# Patient Record
Sex: MALE | Race: WHITE | Employment: UNEMPLOYED | URBAN - METROPOLITAN AREA
[De-identification: names, ages, dates, MRNs, and addresses within clinical notes are randomized per-mention and may not be internally consistent; named-entity substitution may affect disease eponyms.]

---

## 2019-03-18 ENCOUNTER — OFFICE VISIT (OUTPATIENT)
Dept: NEUROLOGY | Age: 19
End: 2019-03-18
Payer: MEDICARE

## 2019-03-18 VITALS
RESPIRATION RATE: 14 BRPM | OXYGEN SATURATION: 99 % | WEIGHT: 110.4 LBS | BODY MASS INDEX: 17.33 KG/M2 | HEIGHT: 67 IN | DIASTOLIC BLOOD PRESSURE: 74 MMHG | HEART RATE: 85 BPM | SYSTOLIC BLOOD PRESSURE: 120 MMHG

## 2019-03-18 DIAGNOSIS — G40.909 SEIZURE DISORDER (HCC): Primary | ICD-10-CM

## 2019-03-18 PROCEDURE — 99204 OFFICE O/P NEW MOD 45 MIN: CPT | Performed by: PHYSICIAN ASSISTANT

## 2019-03-18 RX ORDER — RANITIDINE 150 MG/1
CAPSULE ORAL
COMMUNITY
End: 2019-06-24

## 2019-03-18 RX ORDER — DIAZEPAM 10 MG/2ML
GEL RECTAL
COMMUNITY
Start: 2015-12-03 | End: 2019-11-11 | Stop reason: ALTCHOICE

## 2019-03-18 RX ORDER — RISPERIDONE 1 MG/1
1 TABLET, FILM COATED ORAL 2 TIMES DAILY
COMMUNITY

## 2019-03-18 RX ORDER — LEVETIRACETAM 500 MG/1
500 TABLET ORAL 2 TIMES DAILY
Qty: 60 TABLET | Refills: 3 | Status: SHIPPED | OUTPATIENT
Start: 2019-03-18 | End: 2019-06-24 | Stop reason: SDUPTHER

## 2019-03-18 RX ORDER — POLYETHYLENE GLYCOL 3350 17 G/17G
POWDER, FOR SOLUTION ORAL DAILY PRN
COMMUNITY

## 2019-03-18 RX ORDER — CLONIDINE HYDROCHLORIDE 0.2 MG/1
TABLET ORAL
COMMUNITY

## 2019-03-18 RX ORDER — PETROLATUM 42 G/100G
OINTMENT TOPICAL
COMMUNITY
End: 2019-11-11

## 2019-03-18 RX ORDER — LORATADINE 10 MG/1
CAPSULE, LIQUID FILLED ORAL
COMMUNITY

## 2019-03-18 RX ORDER — NYSTATIN 100000 U/G
CREAM TOPICAL
COMMUNITY
Start: 2017-05-05 | End: 2019-06-24

## 2019-03-18 RX ORDER — PHENOBARBITAL 60 MG/1
TABLET ORAL
COMMUNITY
Start: 2019-02-26 | End: 2019-04-03 | Stop reason: SDUPTHER

## 2019-03-18 SDOH — HEALTH STABILITY: MENTAL HEALTH: HOW OFTEN DO YOU HAVE A DRINK CONTAINING ALCOHOL?: NEVER

## 2019-03-28 ENCOUNTER — TELEPHONE (OUTPATIENT)
Dept: NEUROLOGY | Age: 19
End: 2019-03-28

## 2019-03-28 DIAGNOSIS — G40.909 SEIZURE DISORDER (HCC): Primary | ICD-10-CM

## 2019-03-28 NOTE — TELEPHONE ENCOUNTER
Patients step mother states he needs a refill of the PHENobarbital 60 mg 1 tablet po at bedtime    MA asked if the Keppra was working and step mother stated \"she doesn't know. He has not had any seizures. ?\"    MA informed her the message would be forwarded on to Annemarie Mcgovern for further review.

## 2019-04-03 RX ORDER — PHENOBARBITAL 60 MG/1
TABLET ORAL
Qty: 30 TABLET | Refills: 2 | Status: SHIPPED | OUTPATIENT
Start: 2019-04-03 | End: 2019-06-24

## 2019-06-24 ENCOUNTER — OFFICE VISIT (OUTPATIENT)
Dept: NEUROLOGY | Age: 19
End: 2019-06-24
Payer: MEDICARE

## 2019-06-24 VITALS — OXYGEN SATURATION: 98 % | BODY MASS INDEX: 17.89 KG/M2 | RESPIRATION RATE: 13 BRPM | WEIGHT: 114 LBS | HEIGHT: 67 IN

## 2019-06-24 DIAGNOSIS — G40.909 SEIZURE DISORDER (HCC): ICD-10-CM

## 2019-06-24 PROCEDURE — 99214 OFFICE O/P EST MOD 30 MIN: CPT | Performed by: PHYSICIAN ASSISTANT

## 2019-06-24 RX ORDER — LEVETIRACETAM 500 MG/1
500 TABLET ORAL 2 TIMES DAILY
Qty: 60 TABLET | Refills: 5 | Status: SHIPPED | OUTPATIENT
Start: 2019-06-24 | End: 2019-12-27

## 2019-06-24 NOTE — PATIENT INSTRUCTIONS
Patient Education        levetiracetam  Pronunciation:  MELISSA zimmer RA se hanson  Brand:  Keppra, Keppra XR, Roweepra, Spritam  What is the most important information I should know about levetiracetam?  Some people have thoughts about suicide when first taking this medicine. Stay alert to changes in your mood or symptoms. Report any new or worsening symptoms to your doctor. What is levetiracetam?  Levetiracetam is an anti-epileptic drug, also called an anticonvulsant. Levetiracetam is used to treat partial onset seizures in adults and children who are at least 2 month old. The Spritam brand of this medicine is not for use in children younger than 3years old or children who weigh less than 44 pounds. Levetiracetam is also used to treat tonic-clonic seizures in people who are at least 10years old, and myoclonic seizures in people who are at least 15years old. Levetiracetam may also be used for purposes not listed in this medication guide. What should I discuss with my healthcare provider before taking levetiracetam?  You should not use levetiracetam if you are allergic to it. Tell your doctor if you have ever had:  · kidney disease (or if you are on dialysis);  · depression or other mood problems;  · mental illness or psychosis; or  · suicidal thoughts or actions. You may have thoughts about suicide while taking this medicine. Your doctor should check your progress at regular visits. Your family or other caregivers should also be alert to changes in your mood or symptoms. Follow your doctor's instructions about taking seizure medication if you are pregnant. Seizure control is very important during pregnancy, and having a seizure could harm both mother and baby. Do not start or stop taking this medicine without your doctor's advice, and tell your doctor right away if you become pregnant. If you are pregnant, your name may be listed on a pregnancy registry to track the effects of levetiracetam on the baby.   You a dose? Take the medicine as soon as you can, but skip the missed dose if it is almost time for your next dose. Do not take two doses at one time. Get your prescription refilled before you run out of medicine completely. What happens if I overdose? Seek emergency medical attention or call the Poison Help line at 1-308.413.8771. Overdose symptoms may include extreme drowsiness, agitation, aggression, shallow breathing, weakness, or fainting. What should I avoid while taking levetiracetam?  Drinking alcohol with this medicine can cause side effects and may also increase the risk of seizures. Avoid driving or hazardous activity until you know how this medicine will affect you. Dizziness or drowsiness can cause falls, accidents, or severe injuries. What are the possible side effects of levetiracetam?  Get emergency medical help if you have signs of an allergic reaction (hives, difficult breathing, swelling in your face or throat) or a severe skin reaction (fever, sore throat, burning in your eyes, skin pain, red or purple skin rash that spreads and causes blistering and peeling). Report any new or worsening symptoms to your doctor, such as: mood or behavior changes, depression, anxiety, panic attacks, trouble sleeping, or if you feel agitated, hostile, irritable, hyperactive (mentally or physically), or have thoughts about suicide or hurting yourself. Call your doctor at once if you have:  · unusual changes in mood or behavior (unusual risk-taking behavior, being irritable or talkative);  · confusion, hallucinations, loss of balance or coordination;  · extreme drowsiness, feeling very weak or tired;  · problems with walking or movement;  · the first sign of any skin rash, no matter how mild; or  · fever, chills, weakness, or other signs of infection.   Common side effects may include:  · dizziness, drowsiness, tiredness;  · weakness;  · feeling aggressive or irritable;  · loss of appetite;  · stuffy nose;

## 2019-06-24 NOTE — PROGRESS NOTES
[facility]. Jorden Santos M.D., F.A.C.P. Willie Pantoja, DNP, APRN, ACNS-BC  Cisco Dance. Samantah Young, MSN, APRN-FNP-C  Jacklyn Bui MSPAS, PA-C  Guanakito Cortes, MSN, APRN-FNP-C  [address], [place]  [place], [address] [address]  Phone: [phone]  Fax: [phone]       Demetrio Brower is a 22 y.o. right handed male       Review of Systems:     ROS unable-- patient is non verbal     History of Present Illness :     Patient with ID/DD developed generalized seizures 3 years ago. Previously with one GTC seizure every 3-5 months on Phenobarbital 60 mg HS. Presents with step mother. The patient is non verbal and does not provide any of the history. His step mom is a fair historian. At the last visit, he was started on Keppra 500 mg BID. Phenobarbital was to be continued for the time being. Rx request for phenobarbital refill was sent 3/28/19 to the pharmacy. His step mother states that she never picked this up. He has now been off of the Phenobarbital since March and has been doing well on Keppra alone. The patient did suffer one 2 minute seizure in April due to running out of Keppra and missing a dose. There have been no other seizures. He appears to be tolerating Keppra well without ill effects. Otherwise, he is medically stable. Objective:       Resp 13   Ht 5' 6.5\" (1.689 m)   Wt 114 lb (51.7 kg)   SpO2 98%   BMI 18.12 kg/m² . BP was unable to be obtained today     General appearance: alert, appears stated age, cooperative and in no distress  Head: normocephalic, without obvious abnormality, atraumatic  Eyes: conjunctivae/corneas clear; no drainage  Neck: no adenopathy,supple, symmetrical, trachea midline and thyroid not enlarged, symmetric, no tenderness/mass/nodules  Back: symmetric, no curvature.  ROM normal.   Lungs: clear to auscultation bilaterally  Heart: regular rate and rhythm, S1, S2 normal, no murmur  Extremities: normal, atraumatic, no however it appears grossly non focal      Medically, he is stable     Plan:     Continue Keppra 500 mg BID   Refill sent     Call if any BT seizures   Dose can be adjusted over phone     Continue Diastat prn     Will obtain routine blood work at next visit if no recent blood work by PCP     RTO 6 months or sooner prn     Call with questions or concerns     Mark Gan PA-C  10:17 AM  6/24/2019

## 2019-11-11 ENCOUNTER — OFFICE VISIT (OUTPATIENT)
Dept: NEUROLOGY | Age: 19
End: 2019-11-11
Payer: MEDICARE

## 2019-11-11 VITALS
WEIGHT: 113 LBS | HEART RATE: 83 BPM | OXYGEN SATURATION: 98 % | SYSTOLIC BLOOD PRESSURE: 112 MMHG | DIASTOLIC BLOOD PRESSURE: 78 MMHG | RESPIRATION RATE: 12 BRPM | BODY MASS INDEX: 17.97 KG/M2

## 2019-11-11 DIAGNOSIS — G40.909 SEIZURE DISORDER (HCC): Primary | ICD-10-CM

## 2019-11-11 PROCEDURE — 1036F TOBACCO NON-USER: CPT | Performed by: PHYSICIAN ASSISTANT

## 2019-11-11 PROCEDURE — G8484 FLU IMMUNIZE NO ADMIN: HCPCS | Performed by: PHYSICIAN ASSISTANT

## 2019-11-11 PROCEDURE — 99213 OFFICE O/P EST LOW 20 MIN: CPT | Performed by: PHYSICIAN ASSISTANT

## 2019-11-11 PROCEDURE — G8427 DOCREV CUR MEDS BY ELIG CLIN: HCPCS | Performed by: PHYSICIAN ASSISTANT

## 2019-11-11 PROCEDURE — G8419 CALC BMI OUT NRM PARAM NOF/U: HCPCS | Performed by: PHYSICIAN ASSISTANT

## 2019-11-11 RX ORDER — DIAZEPAM 10 MG/2ML
10 GEL RECTAL PRN
Qty: 15 EACH | Refills: 5 | Status: SHIPPED | OUTPATIENT
Start: 2019-11-11 | End: 2020-11-11

## 2019-12-27 DIAGNOSIS — G40.909 SEIZURE DISORDER (HCC): ICD-10-CM

## 2019-12-27 RX ORDER — LEVETIRACETAM 500 MG/1
TABLET ORAL
Qty: 60 TABLET | Refills: 5 | Status: SHIPPED
Start: 2019-12-27 | End: 2020-07-07

## 2020-07-07 RX ORDER — LEVETIRACETAM 500 MG/1
TABLET ORAL
Qty: 60 TABLET | Refills: 5 | Status: SHIPPED
Start: 2020-07-07 | End: 2020-07-29 | Stop reason: SDUPTHER

## 2020-07-29 ENCOUNTER — VIRTUAL VISIT (OUTPATIENT)
Dept: NEUROLOGY | Age: 20
End: 2020-07-29
Payer: MEDICARE

## 2020-07-29 PROCEDURE — 99212 OFFICE O/P EST SF 10 MIN: CPT | Performed by: PHYSICIAN ASSISTANT

## 2020-07-29 RX ORDER — LEVETIRACETAM 500 MG/1
TABLET ORAL
Qty: 60 TABLET | Refills: 11 | Status: SHIPPED
Start: 2020-07-29 | End: 2021-05-04 | Stop reason: SDUPTHER

## 2020-07-29 NOTE — PROGRESS NOTES
[address] [facility]. Sunshine Ocasio M.D., F.A.C.P. Anish Summers, DNP, APRN, CNS  Olya Barkley. Donna Gerardo, MSN, APRN-FNP-C  Franky Chung MSN, APRN, FNP-C  Iza VELASQUEZ, PA-C  [address] MSN, APRN, FNP-C  [address], [facility]  [place], [address] [address]  Phone: [phone]  Fax: [phone]             Shahid Garcia is a 20 y.o. male evaluated via telephone on 7/29/2020. The patient and/or health care decision maker is aware that that he/she may receive a bill for this telephone service, depending on his/her insurance coverage, and has provided verbal consent to proceed: Yes    I affirm this is a Patient Initiated Episode with an Established Patient who has not had a related appointment within my department in the past 7 days or scheduled within the next 24 hours. The patient's identity was verified at the start of the call. Others involved in call: Stepmother Kaitlin Garvey) who provided all of the interim history as the patient is non verbal     Total Time: minutes: 5-10 minutes    Note: not billable if this call serves to triage the patient into an appointment for the relevant concern    HPI:      Shahid Garcia is a 20 y.o. right handed male     Patient is non verbal and history is obtained from his step-mother.      Patient with ID/DD developed generalized seizures 4 years ago. Previously with one GTC seizure every 3-5 months on Phenobarbital 60 mg HS. He has since weaned off of Phenobarbital and is on Keppra 500 mg BID.       He has possibly had \"one small one\" described as eyes rolling back and knees shaking a little in the setting of extreme heat in the last year. No GTC seizures in the last year (last 4/2019).      No chest pain or palpitations  No SOB  No vertigo, lightheadedness or loss of consciousness  No falls, tripping or stumbling  No incontinence of bowels or bladder  No itching or bruising   No numbness, tingling or focal arm/leg weakness    ROS otherwise negative      Medications:     Prior to Admission medications    Medication Sig Start Date End Date Taking? Authorizing Provider   levETIRAcetam (KEPPRA) 500 MG tablet take 1 tablet by mouth twice a day 7/7/20  Yes KEYSHAWN Kyle   diazepam (DIASTAT ACUDIAL) 10 MG GEL Place 10 mg rectally as needed (seizures). 11/11/19 11/11/20 Yes KEYSHAWN Kyle   risperiDONE (RISPERDAL) 1 MG tablet Take 1 mg by mouth 2 times daily Indications: Take 0.5mg in the AM and 1mg at night    Yes Historical Provider, MD   polyethylene glycol (GLYCOLAX) packet Take by mouth daily as needed    Yes Historical Provider, MD   OMEPRAZOLE PO Take by mouth   Yes Historical Provider, MD   loratadine (CLARITIN) 10 MG capsule Take by mouth   Yes Historical Provider, MD   cloNIDine (CATAPRES) 0.2 MG tablet Take by mouth   Yes Historical Provider, MD       Laboratory/Radiology:      There are no recent labs or images  for me to review     All labs and images personally reviewed today    Assessment:     20year old man with autism and severe ID/DD suffers from a generalized seizure disorder with unknown onset and impaired awareness             Last seizure in 4/2019 in the setting of a missed Keppra dose -- one \"small\"  seizure reported since then with eyes rolling back and knees shaking in the  setting of extreme heat              Has been tolerating Keppra 500 mg BID well    Plan:     Continue Keppra 500 mg BID    Refill sent    Continue Diastat prn      Call with any BT seizures     RTO 9 months     Call with questions or concerns     Crissy Parkinson PA-C  1:49 PM  7/29/2020

## 2021-05-04 ENCOUNTER — OFFICE VISIT (OUTPATIENT)
Dept: NEUROLOGY | Age: 21
End: 2021-05-04
Payer: MEDICARE

## 2021-05-04 VITALS
SYSTOLIC BLOOD PRESSURE: 126 MMHG | HEART RATE: 77 BPM | OXYGEN SATURATION: 98 % | DIASTOLIC BLOOD PRESSURE: 76 MMHG | TEMPERATURE: 98.2 F

## 2021-05-04 DIAGNOSIS — G40.909 SEIZURE DISORDER (HCC): ICD-10-CM

## 2021-05-04 PROCEDURE — 1036F TOBACCO NON-USER: CPT | Performed by: PHYSICIAN ASSISTANT

## 2021-05-04 PROCEDURE — G8421 BMI NOT CALCULATED: HCPCS | Performed by: PHYSICIAN ASSISTANT

## 2021-05-04 PROCEDURE — 99213 OFFICE O/P EST LOW 20 MIN: CPT | Performed by: PHYSICIAN ASSISTANT

## 2021-05-04 PROCEDURE — G8427 DOCREV CUR MEDS BY ELIG CLIN: HCPCS | Performed by: PHYSICIAN ASSISTANT

## 2021-05-04 RX ORDER — LEVETIRACETAM 500 MG/1
TABLET ORAL
Qty: 60 TABLET | Refills: 11 | Status: SHIPPED
Start: 2021-05-04 | End: 2022-05-31

## 2021-05-04 NOTE — PROGRESS NOTES
[facility]. Kim Mijares M.D., F.A.C.P. Radha Lucas, DNP, APRN, ACNS-BC  Bayron Smith. Blanca Null, MSN, APRN-FNP-C  Scottie Gastelum, RON, PA-C  Sunita Nieves, MSN, APRN-FNP-C  [address], [facility]  [place], [address] [address]  Phone: [phone]  Fax: [phone]       Fay Licona is a 23 y.o. right handed male     Patient with ID/DD developed generalized seizures 5 years ago. Previously with one GTC seizure every 3-5 months on Phenobarbital 60 mg HS. He has since weaned off of Phenobarbital and is on Keppra 500 mg BID. Presents with step mother. The patient is non verbal and does not provide any of the history. His step mom is a fair historian. Per family, patient has had no seizures since last visit. Continues on Keppra 500 mg BID without ill effect. Last seizure 4/2019. Possible \"small\" seizure in Summer of 2020 due to extreme heat. No further seizure activity since. Otherwise, he is medically stable. ROS unable-- patient is non verbal     Objective:       /76 (Site: Right Upper Arm)   Pulse 77   Temp 98.2 °F (36.8 °C)   SpO2 98% . General appearance: alert, appears stated age, cooperative and in no distress  Head: normocephalic, without obvious abnormality, atraumatic  Eyes: conjunctivae/corneas clear; no drainage  Neck: no adenopathy,supple  Lungs: clear to auscultation bilaterally  Heart: regular rate and rhythm, S1, S2 normal, no murmur  Extremities: normal, atraumatic, no cyanosis or edema  Skin:  No rashes or lesions. Mental Status: Alert. Non verbal. Follows very few simple commands.  Attends to examiner      Decreased attention/concentration      Exam limited due to lack of cooperation from patient's severe developmental delay      Speech/Language: Nonverbal      Cranial Nerves:  I: smell     II: visual acuity      II: visual fields Blink to threat    II: pupils SAUD   III,VII: ptosis None   III,IV,VI: extraocular muscles Tracks examiner around room    V: mastication Normal   V: facial light touch sensation      V,VII: corneal reflex      VII: facial muscle function - upper  Normal   VII: facial muscle function - lower Face symmetric    VIII: hearing Intact    IX: soft palate elevation  Normal   IX,X: gag reflex     XI: trapezius strength  5/5   XI: sternocleidomastoid strength 5/5   XI: neck extension strength  5/5   XII: tongue strength  Normal      Motor:  Moves all limbs symmetrically against gravity-- does not cooperating for strength testing    Decreased bulk   Paratonia at times   No abnormal movements     Sensory:  Attends to vibration in all limbs     Coordination:   Does not cooperate for testing     Gait:  Normal     DTR:   +1 throughout      No Vargas's     No other pathological reflexes    Examination is unchanged today      Laboratory/Radiology: There are no recent labs or imaging for me to review     All labs and imaging were personally reviewed today     Assessment: This 23year old man with autism and severe ID/DD suffers from a generalized seizure disorder with unknown onset and impaired awareness             Last seizure in 4/2019 in the setting of a missed Keppra dose and possible \"small\" seizure in  summer 2020 in the setting of extreme heat.  No recent seizure activity reported   Has been tolerating Keppra 500 mg BID well     His neurologic examination is limited due to his cooperation and ID, however it appears grossly non focal      Medically, he is stable     Plan:     Continue Keppra 500 mg BID   Refill sent     Call if any BT seizures   Dose can be adjusted over phone     Continue Diastat prn     RTO 1 year or sooner prn     Call with questions or concerns     Rayshawn Horan PA-C  11:57 AM  5/4/2021

## 2022-05-27 DIAGNOSIS — G40.909 SEIZURE DISORDER (HCC): ICD-10-CM

## 2022-05-31 ENCOUNTER — OFFICE VISIT (OUTPATIENT)
Dept: NEUROLOGY | Age: 22
End: 2022-05-31
Payer: MEDICARE

## 2022-05-31 VITALS
OXYGEN SATURATION: 99 % | SYSTOLIC BLOOD PRESSURE: 100 MMHG | DIASTOLIC BLOOD PRESSURE: 70 MMHG | TEMPERATURE: 97.6 F | HEART RATE: 77 BPM

## 2022-05-31 DIAGNOSIS — G40.909 SEIZURE DISORDER (HCC): ICD-10-CM

## 2022-05-31 PROCEDURE — G8427 DOCREV CUR MEDS BY ELIG CLIN: HCPCS | Performed by: PHYSICIAN ASSISTANT

## 2022-05-31 PROCEDURE — G8421 BMI NOT CALCULATED: HCPCS | Performed by: PHYSICIAN ASSISTANT

## 2022-05-31 PROCEDURE — 1036F TOBACCO NON-USER: CPT | Performed by: PHYSICIAN ASSISTANT

## 2022-05-31 PROCEDURE — 99213 OFFICE O/P EST LOW 20 MIN: CPT | Performed by: PHYSICIAN ASSISTANT

## 2022-05-31 RX ORDER — LEVETIRACETAM 500 MG/1
TABLET ORAL
Qty: 60 TABLET | Refills: 11 | Status: SHIPPED
Start: 2022-05-31 | End: 2022-05-31 | Stop reason: SDUPTHER

## 2022-05-31 RX ORDER — ASCORBIC ACID 500 MG
TABLET ORAL
COMMUNITY
Start: 2022-05-07

## 2022-05-31 RX ORDER — LEVETIRACETAM 500 MG/1
TABLET ORAL
Qty: 60 TABLET | Refills: 11 | Status: SHIPPED | OUTPATIENT
Start: 2022-05-31

## 2022-05-31 NOTE — PROGRESS NOTES
extraocular muscles  Tracks examiner around room    V: mastication Normal   V: facial light touch sensation      V,VII: corneal reflex      VII: facial muscle function - upper  Normal   VII: facial muscle function - lower Face symmetric    VIII: hearing Intact    IX: soft palate elevation  Normal   IX,X: gag reflex     XI: trapezius strength  5/5   XI: sternocleidomastoid strength 5/5   XI: neck extension strength  5/5   XII: tongue strength  Normal      Motor:  Moves all limbs symmetrically against gravity-- does not cooperating for strength testing      No abnormal movements     Sensory:  Attends to vibration in all limbs     Coordination:   Does not cooperate for testing     Gait:  Normal     DTR:   +1 throughout      No other pathological reflexes    Examination is unchanged today      Laboratory/Radiology: There are no recent labs or imaging for me to review     All labs and imaging were personally reviewed today     Assessment: This 24year old man with autism and severe ID/DD suffers from a generalized seizure disorder with unknown onset and impaired awareness             Last seizure in 4/2019 in the setting of a missed Keppra dose and possible \"small\" seizure in  summer 2020 in the setting of extreme heat.  No recent seizure activity reported   Has been tolerating Keppra 500 mg BID well     His neurologic examination is limited due to his cooperation and ID, however it appears grossly non focal      Medically, he is stable     Plan:     Continue Keppra 500 mg BID   Refill sent     Call if any BT seizures    Continue Diastat prn      RTO 1 year or sooner prn     Call with questions or concerns     Mckay Cash PA-C  2:04 PM  5/31/2022

## 2023-05-20 DIAGNOSIS — G40.909 SEIZURE DISORDER (HCC): ICD-10-CM

## 2023-05-22 RX ORDER — LEVETIRACETAM 500 MG/1
TABLET ORAL
Qty: 60 TABLET | Refills: 11 | Status: SHIPPED | OUTPATIENT
Start: 2023-05-22

## 2023-06-29 ENCOUNTER — OFFICE VISIT (OUTPATIENT)
Dept: NEUROLOGY | Age: 23
End: 2023-06-29
Payer: MEDICAID

## 2023-06-29 VITALS
HEART RATE: 77 BPM | DIASTOLIC BLOOD PRESSURE: 72 MMHG | WEIGHT: 113 LBS | HEIGHT: 66 IN | TEMPERATURE: 98.6 F | BODY MASS INDEX: 18.16 KG/M2 | RESPIRATION RATE: 16 BRPM | SYSTOLIC BLOOD PRESSURE: 110 MMHG | OXYGEN SATURATION: 97 %

## 2023-06-29 DIAGNOSIS — G40.909 SEIZURE DISORDER (HCC): ICD-10-CM

## 2023-06-29 PROCEDURE — 99213 OFFICE O/P EST LOW 20 MIN: CPT | Performed by: PHYSICIAN ASSISTANT

## 2023-06-29 RX ORDER — LEVETIRACETAM 500 MG/1
TABLET ORAL
Qty: 60 TABLET | Refills: 11 | Status: SHIPPED | OUTPATIENT
Start: 2023-06-29

## 2024-09-26 ENCOUNTER — OFFICE VISIT (OUTPATIENT)
Dept: NEUROLOGY | Age: 24
End: 2024-09-26
Payer: MEDICAID

## 2024-09-26 VITALS
DIASTOLIC BLOOD PRESSURE: 68 MMHG | OXYGEN SATURATION: 98 % | WEIGHT: 128 LBS | HEART RATE: 89 BPM | BODY MASS INDEX: 20.66 KG/M2 | SYSTOLIC BLOOD PRESSURE: 112 MMHG

## 2024-09-26 DIAGNOSIS — G40.909 SEIZURE DISORDER (HCC): Primary | ICD-10-CM

## 2024-09-26 PROCEDURE — 99214 OFFICE O/P EST MOD 30 MIN: CPT | Performed by: PHYSICIAN ASSISTANT

## 2024-09-26 RX ORDER — MIDAZOLAM 5 MG/.1ML
5 SPRAY NASAL PRN
Qty: 2 EACH | Refills: 2 | Status: SHIPPED | OUTPATIENT
Start: 2024-09-26

## 2025-01-03 ENCOUNTER — HOSPITAL ENCOUNTER (OUTPATIENT)
Age: 25
Setting detail: OBSERVATION
Discharge: HOME | End: 2025-01-05
Attending: EMERGENCY MEDICINE | Admitting: GENERAL PRACTICE
Payer: MEDICAID

## 2025-01-03 DIAGNOSIS — R19.7 NAUSEA VOMITING AND DIARRHEA: Primary | ICD-10-CM

## 2025-01-03 DIAGNOSIS — R11.2 NAUSEA VOMITING AND DIARRHEA: Primary | ICD-10-CM

## 2025-01-03 LAB
ABO + RH BLD: NORMAL
ALBUMIN SERPL-MCNC: 4.9 G/DL (ref 3.5–5.2)
ALP SERPL-CCNC: 49 U/L (ref 40–129)
ALT SERPL-CCNC: 14 U/L (ref 0–40)
ANION GAP SERPL CALCULATED.3IONS-SCNC: 16 MMOL/L (ref 7–16)
ARM BAND NUMBER: NORMAL
AST SERPL-CCNC: 21 U/L (ref 0–39)
BASOPHILS # BLD: 0.01 K/UL (ref 0–0.2)
BASOPHILS NFR BLD: 0 % (ref 0–2)
BILIRUB SERPL-MCNC: 0.7 MG/DL (ref 0–1.2)
BLOOD BANK SAMPLE EXPIRATION: NORMAL
BLOOD GROUP ANTIBODIES SERPL: NEGATIVE
BUN SERPL-MCNC: 17 MG/DL (ref 6–20)
CALCIUM SERPL-MCNC: 9.9 MG/DL (ref 8.6–10.2)
CHLORIDE SERPL-SCNC: 103 MMOL/L (ref 98–107)
CO2 SERPL-SCNC: 20 MMOL/L (ref 22–29)
CREAT SERPL-MCNC: 0.8 MG/DL (ref 0.7–1.2)
EOSINOPHIL # BLD: 0 K/UL (ref 0.05–0.5)
EOSINOPHILS RELATIVE PERCENT: 0 % (ref 0–6)
ERYTHROCYTE [DISTWIDTH] IN BLOOD BY AUTOMATED COUNT: 11.8 % (ref 11.5–15)
GFR, ESTIMATED: >90 ML/MIN/1.73M2
GLUCOSE SERPL-MCNC: 139 MG/DL (ref 74–99)
HCT VFR BLD AUTO: 46.5 % (ref 37–54)
HGB BLD-MCNC: 16 G/DL (ref 12.5–16.5)
IMM GRANULOCYTES # BLD AUTO: <0.03 K/UL (ref 0–0.58)
IMM GRANULOCYTES NFR BLD: 0 % (ref 0–5)
LACTATE BLDV-SCNC: 0.8 MMOL/L (ref 0.5–2.2)
LACTATE BLDV-SCNC: 2.5 MMOL/L (ref 0.5–2.2)
LIPASE SERPL-CCNC: 18 U/L (ref 13–60)
LYMPHOCYTES NFR BLD: 0.42 K/UL (ref 1.5–4)
LYMPHOCYTES RELATIVE PERCENT: 6 % (ref 20–42)
MCH RBC QN AUTO: 28.9 PG (ref 26–35)
MCHC RBC AUTO-ENTMCNC: 34.4 G/DL (ref 32–34.5)
MCV RBC AUTO: 84.1 FL (ref 80–99.9)
MONOCYTES NFR BLD: 0.44 K/UL (ref 0.1–0.95)
MONOCYTES NFR BLD: 6 % (ref 2–12)
NEUTROPHILS NFR BLD: 88 % (ref 43–80)
NEUTS SEG NFR BLD: 6.38 K/UL (ref 1.8–7.3)
PLATELET # BLD AUTO: 345 K/UL (ref 130–450)
PMV BLD AUTO: 10.8 FL (ref 7–12)
POTASSIUM SERPL-SCNC: 3.8 MMOL/L (ref 3.5–5)
PROT SERPL-MCNC: 8.9 G/DL (ref 6.4–8.3)
RBC # BLD AUTO: 5.53 M/UL (ref 3.8–5.8)
RBC # BLD: NORMAL 10*6/UL
SODIUM SERPL-SCNC: 139 MMOL/L (ref 132–146)
WBC OTHER # BLD: 7.3 K/UL (ref 4.5–11.5)

## 2025-01-03 PROCEDURE — 96374 THER/PROPH/DIAG INJ IV PUSH: CPT

## 2025-01-03 PROCEDURE — 85025 COMPLETE CBC W/AUTO DIFF WBC: CPT

## 2025-01-03 PROCEDURE — 6360000002 HC RX W HCPCS: Performed by: PHYSICIAN ASSISTANT

## 2025-01-03 PROCEDURE — 96375 TX/PRO/DX INJ NEW DRUG ADDON: CPT

## 2025-01-03 PROCEDURE — 96361 HYDRATE IV INFUSION ADD-ON: CPT

## 2025-01-03 PROCEDURE — G0378 HOSPITAL OBSERVATION PER HR: HCPCS

## 2025-01-03 PROCEDURE — 86900 BLOOD TYPING SEROLOGIC ABO: CPT

## 2025-01-03 PROCEDURE — 86850 RBC ANTIBODY SCREEN: CPT

## 2025-01-03 PROCEDURE — 83605 ASSAY OF LACTIC ACID: CPT

## 2025-01-03 PROCEDURE — 99285 EMERGENCY DEPT VISIT HI MDM: CPT

## 2025-01-03 PROCEDURE — 83690 ASSAY OF LIPASE: CPT

## 2025-01-03 PROCEDURE — 80053 COMPREHEN METABOLIC PANEL: CPT

## 2025-01-03 PROCEDURE — 86901 BLOOD TYPING SEROLOGIC RH(D): CPT

## 2025-01-03 PROCEDURE — 2580000003 HC RX 258: Performed by: PHYSICIAN ASSISTANT

## 2025-01-03 RX ORDER — ONDANSETRON 2 MG/ML
4 INJECTION INTRAMUSCULAR; INTRAVENOUS ONCE
Status: COMPLETED | OUTPATIENT
Start: 2025-01-03 | End: 2025-01-03

## 2025-01-03 RX ORDER — PANTOPRAZOLE SODIUM 40 MG/10ML
80 INJECTION, POWDER, LYOPHILIZED, FOR SOLUTION INTRAVENOUS ONCE
Status: COMPLETED | OUTPATIENT
Start: 2025-01-03 | End: 2025-01-03

## 2025-01-03 RX ORDER — 0.9 % SODIUM CHLORIDE 0.9 %
1000 INTRAVENOUS SOLUTION INTRAVENOUS ONCE
Status: COMPLETED | OUTPATIENT
Start: 2025-01-03 | End: 2025-01-03

## 2025-01-03 RX ORDER — LOPERAMIDE HYDROCHLORIDE 2 MG/1
2 CAPSULE ORAL
COMMUNITY
Start: 2024-12-17

## 2025-01-03 RX ORDER — LEVETIRACETAM 500 MG/5ML
500 INJECTION, SOLUTION, CONCENTRATE INTRAVENOUS 2 TIMES DAILY
Status: DISCONTINUED | OUTPATIENT
Start: 2025-01-03 | End: 2025-01-05 | Stop reason: HOSPADM

## 2025-01-03 RX ORDER — SODIUM CHLORIDE 9 MG/ML
INJECTION, SOLUTION INTRAVENOUS CONTINUOUS
Status: DISCONTINUED | OUTPATIENT
Start: 2025-01-03 | End: 2025-01-05 | Stop reason: HOSPADM

## 2025-01-03 RX ORDER — LOPERAMIDE HYDROCHLORIDE 2 MG/1
2 CAPSULE ORAL 4 TIMES DAILY PRN
Status: DISCONTINUED | OUTPATIENT
Start: 2025-01-04 | End: 2025-01-05 | Stop reason: HOSPADM

## 2025-01-03 RX ORDER — ONDANSETRON 4 MG/1
4 TABLET, FILM COATED ORAL 3 TIMES DAILY PRN
Qty: 12 TABLET | Refills: 0 | Status: SHIPPED | OUTPATIENT
Start: 2025-01-03

## 2025-01-03 RX ORDER — ONDANSETRON 2 MG/ML
4 INJECTION INTRAMUSCULAR; INTRAVENOUS EVERY 4 HOURS PRN
Status: DISCONTINUED | OUTPATIENT
Start: 2025-01-03 | End: 2025-01-05 | Stop reason: HOSPADM

## 2025-01-03 RX ORDER — PROCHLORPERAZINE EDISYLATE 5 MG/ML
10 INJECTION INTRAMUSCULAR; INTRAVENOUS EVERY 6 HOURS PRN
Status: DISCONTINUED | OUTPATIENT
Start: 2025-01-03 | End: 2025-01-05 | Stop reason: HOSPADM

## 2025-01-03 RX ADMIN — SODIUM CHLORIDE 1000 ML: 9 INJECTION, SOLUTION INTRAVENOUS at 17:02

## 2025-01-03 RX ADMIN — ONDANSETRON 4 MG: 2 INJECTION INTRAMUSCULAR; INTRAVENOUS at 15:36

## 2025-01-03 RX ADMIN — PANTOPRAZOLE SODIUM 80 MG: 40 INJECTION, POWDER, FOR SOLUTION INTRAVENOUS at 15:36

## 2025-01-03 RX ADMIN — SODIUM CHLORIDE 1000 ML: 9 INJECTION, SOLUTION INTRAVENOUS at 15:36

## 2025-01-03 ASSESSMENT — LIFESTYLE VARIABLES
HOW MANY STANDARD DRINKS CONTAINING ALCOHOL DO YOU HAVE ON A TYPICAL DAY: PATIENT DOES NOT DRINK
HOW OFTEN DO YOU HAVE A DRINK CONTAINING ALCOHOL: NEVER

## 2025-01-03 NOTE — ED PROVIDER NOTES
MEDICATIONS       Previous Medications    CLONIDINE (CATAPRES) 0.2 MG TABLET    Take by mouth    LEVETIRACETAM (KEPPRA) 500 MG TABLET    take 1 tablet by mouth twice a day    LORATADINE (CLARITIN) 10 MG CAPSULE    Take by mouth    MIDAZOLAM (NAYZILAM) 5 MG/0.1ML SOLN    5 mg by Nasal route as needed (seizure)    OMEPRAZOLE PO    Take 20 mg by mouth     POLYETHYLENE GLYCOL (GLYCOLAX) PACKET    Take by mouth daily as needed     RISPERIDONE (RISPERDAL) 1 MG TABLET    Take 1 tablet by mouth 2 times daily Indications: Take 0.5mg in the AM and 1mg at night    VITAMIN C (ASCORBIC ACID) 500 MG TABLET    take 1 tablet by mouth once daily    VITAMIN D (CHOLECALCIFEROL) 125 MCG (5000 UT) CAPS CAPSULE    Take by mouth daily       SCREENINGS     [place] Coma Scale  Eye Opening: Spontaneous  Best Verbal Response: Oriented  Best Motor Response: Obeys commands  [place] Coma Scale Score: 15         CIWA Assessment  BP: (!) 124/108  Pulse: 100       PHYSICAL EXAM   Oxygen Saturation Interpretation: Normal on room air analysis.        ED Triage Vitals   BP Systolic BP Percentile Diastolic BP Percentile Temp Temp src Pulse Resp SpO2   01/03/25 1326 -- -- 01/03/25 1325 -- 01/03/25 1325 -- 01/03/25 1325   122/82   99.7 °F (37.6 °C)  89  99 %      Height Weight         -- --                         Physical Exam  Constitutional/General: Alert but nonverbal.   NAD.   Significant intellectual and developmental delay noted  HEENT:  NC/NT. PERRLA,  Airway patent.  Dry oral mucosa  Neck: Supple, full ROM, non tender to palpation in the midline.    Respiratory: Lungs clear to auscultation bilaterally, no wheezes, rales, or rhonchi. Not in respiratory distress  CV:  Regular rate. Regular rhythm. No murmurs, gallops, or rubs. 2+ distal pulses  Chest: No chest wall tenderness  GI:  + BS.  Abdomen non distended and soft.  No palpable tenderness.  No rebound, guarding, or rigidity. No pulsatile masses.  Musculoskeletal: Moves all extremities x 4.

## 2025-01-03 NOTE — ED NOTES
Pt tolerated eating his pudding and drinking a can of ginger ale with no difficulties. Pt has had no emesis during my shift.

## 2025-01-04 LAB
ALBUMIN SERPL-MCNC: 3.8 G/DL (ref 3.5–5.2)
ALP SERPL-CCNC: 36 U/L (ref 40–129)
ALT SERPL-CCNC: 11 U/L (ref 0–40)
ANION GAP SERPL CALCULATED.3IONS-SCNC: 9 MMOL/L (ref 7–16)
AST SERPL-CCNC: 19 U/L (ref 0–39)
BASOPHILS # BLD: 0.01 K/UL (ref 0–0.2)
BASOPHILS NFR BLD: 0 % (ref 0–2)
BILIRUB SERPL-MCNC: 0.4 MG/DL (ref 0–1.2)
BUN SERPL-MCNC: 11 MG/DL (ref 6–20)
CALCIUM SERPL-MCNC: 8.3 MG/DL (ref 8.6–10.2)
CHLORIDE SERPL-SCNC: 109 MMOL/L (ref 98–107)
CO2 SERPL-SCNC: 22 MMOL/L (ref 22–29)
CREAT SERPL-MCNC: 0.8 MG/DL (ref 0.7–1.2)
EOSINOPHIL # BLD: 0.01 K/UL (ref 0.05–0.5)
EOSINOPHILS RELATIVE PERCENT: 0 % (ref 0–6)
ERYTHROCYTE [DISTWIDTH] IN BLOOD BY AUTOMATED COUNT: 12.1 % (ref 11.5–15)
GFR, ESTIMATED: >90 ML/MIN/1.73M2
GLUCOSE SERPL-MCNC: 89 MG/DL (ref 74–99)
HCT VFR BLD AUTO: 38.4 % (ref 37–54)
HGB BLD-MCNC: 12.8 G/DL (ref 12.5–16.5)
IMM GRANULOCYTES # BLD AUTO: <0.03 K/UL (ref 0–0.58)
IMM GRANULOCYTES NFR BLD: 0 % (ref 0–5)
LYMPHOCYTES NFR BLD: 0.99 K/UL (ref 1.5–4)
LYMPHOCYTES RELATIVE PERCENT: 19 % (ref 20–42)
MCH RBC QN AUTO: 28.8 PG (ref 26–35)
MCHC RBC AUTO-ENTMCNC: 33.3 G/DL (ref 32–34.5)
MCV RBC AUTO: 86.5 FL (ref 80–99.9)
MONOCYTES NFR BLD: 0.53 K/UL (ref 0.1–0.95)
MONOCYTES NFR BLD: 10 % (ref 2–12)
NEUTROPHILS NFR BLD: 71 % (ref 43–80)
NEUTS SEG NFR BLD: 3.77 K/UL (ref 1.8–7.3)
PLATELET # BLD AUTO: 243 K/UL (ref 130–450)
PMV BLD AUTO: 10.7 FL (ref 7–12)
POTASSIUM SERPL-SCNC: 3.6 MMOL/L (ref 3.5–5)
PROT SERPL-MCNC: 6.8 G/DL (ref 6.4–8.3)
RBC # BLD AUTO: 4.44 M/UL (ref 3.8–5.8)
SODIUM SERPL-SCNC: 140 MMOL/L (ref 132–146)
WBC OTHER # BLD: 5.3 K/UL (ref 4.5–11.5)

## 2025-01-04 PROCEDURE — 2580000003 HC RX 258: Performed by: GENERAL PRACTICE

## 2025-01-04 PROCEDURE — 85025 COMPLETE CBC W/AUTO DIFF WBC: CPT

## 2025-01-04 PROCEDURE — 36415 COLL VENOUS BLD VENIPUNCTURE: CPT

## 2025-01-04 PROCEDURE — 80053 COMPREHEN METABOLIC PANEL: CPT

## 2025-01-04 PROCEDURE — 96361 HYDRATE IV INFUSION ADD-ON: CPT

## 2025-01-04 PROCEDURE — G0378 HOSPITAL OBSERVATION PER HR: HCPCS

## 2025-01-04 PROCEDURE — 6360000002 HC RX W HCPCS: Performed by: GENERAL PRACTICE

## 2025-01-04 PROCEDURE — 96376 TX/PRO/DX INJ SAME DRUG ADON: CPT

## 2025-01-04 PROCEDURE — 96375 TX/PRO/DX INJ NEW DRUG ADDON: CPT

## 2025-01-04 RX ADMIN — SODIUM CHLORIDE: 9 INJECTION, SOLUTION INTRAVENOUS at 01:08

## 2025-01-04 RX ADMIN — SODIUM CHLORIDE: 9 INJECTION, SOLUTION INTRAVENOUS at 11:17

## 2025-01-04 RX ADMIN — LEVETIRACETAM 500 MG: 100 INJECTION INTRAVENOUS at 23:18

## 2025-01-04 RX ADMIN — LEVETIRACETAM 500 MG: 100 INJECTION INTRAVENOUS at 08:25

## 2025-01-04 RX ADMIN — SODIUM CHLORIDE 40 MG: 9 INJECTION INTRAMUSCULAR; INTRAVENOUS; SUBCUTANEOUS at 08:25

## 2025-01-04 RX ADMIN — LEVETIRACETAM 500 MG: 100 INJECTION INTRAVENOUS at 01:04

## 2025-01-04 NOTE — ED NOTES
Pt had another episode of emesis. Bed linens changed, pt placed in clean gown. Pts mother at bedside.

## 2025-01-04 NOTE — ED NOTES
Went in to the room to discharge the patient and patient had a small amount of emesis behind his bed. Pt will be admitted.

## 2025-01-04 NOTE — PLAN OF CARE
Problem: Discharge Planning  Goal: Discharge to home or other facility with appropriate resources  Outcome: Progressing  Flowsheets (Taken 1/3/2025 2115)  Discharge to home or other facility with appropriate resources: Refer to discharge planning if patient needs post-hospital services based on physician order or complex needs related to functional status, cognitive ability or social support system     Problem: Risk for Elopement  Goal: Patient will not exit the unit/facility without proper excort  Outcome: Progressing  Flowsheets  Taken 1/3/2025 2115 by Lisa Hickman, RN  Nursing Interventions for Elopement Risk:   Assist with personal care needs such as toileting, eating, dressing, as needed to reduce the risk of wandering   Collaborate with family members/caregivers to mitigate the elopement risk   Communicate/escalate to charge nurse the risk of elopement   Make sure patient has all necessary personal care items   Escort with two staff members if patient must leave the unit   Place patient in room far away from exits and stairways   Reduce environmental triggers   Shoes and clothing collected and placed in gown attire  Taken 1/3/2025 1941 by Emily Dodge RN  Nursing Interventions for Elopement Risk:   Assist with personal care needs such as toileting, eating, dressing, as needed to reduce the risk of wandering   Collaborate with family members/caregivers to mitigate the elopement risk   Communicate/escalate to charge nurse the risk of elopement   Make sure patient has all necessary personal care items   Escort with two staff members if patient must leave the unit   Place patient in room far away from exits and stairways   Reduce environmental triggers   Shoes and clothing collected and placed in gown attire     Problem: Pain  Goal: Verbalizes/displays adequate comfort level or baseline comfort level  Outcome: Progressing

## 2025-01-04 NOTE — PLAN OF CARE
Problem: Discharge Planning  Goal: Discharge to home or other facility with appropriate resources  1/4/2025 0932 by Ailyn Quiroga RN  Outcome: Progressing  1/4/2025 0845 by Lisa Hickman RN  Outcome: Progressing  Flowsheets (Taken 1/3/2025 2115)  Discharge to home or other facility with appropriate resources: Refer to discharge planning if patient needs post-hospital services based on physician order or complex needs related to functional status, cognitive ability or social support system     Problem: Risk for Elopement  Goal: Patient will not exit the unit/facility without proper excort  1/4/2025 0932 by Ailyn Quiroga RN  Outcome: Progressing  1/4/2025 0845 by Lisa Hickman RN  Outcome: Progressing  Flowsheets  Taken 1/3/2025 2115 by Lisa Hickman RN  Nursing Interventions for Elopement Risk:   Assist with personal care needs such as toileting, eating, dressing, as needed to reduce the risk of wandering   Collaborate with family members/caregivers to mitigate the elopement risk   Communicate/escalate to charge nurse the risk of elopement   Make sure patient has all necessary personal care items   Escort with two staff members if patient must leave the unit   Place patient in room far away from exits and stairways   Reduce environmental triggers   Shoes and clothing collected and placed in gown attire  Taken 1/3/2025 1941 by Emily Dodge, PETE  Nursing Interventions for Elopement Risk:   Assist with personal care needs such as toileting, eating, dressing, as needed to reduce the risk of wandering   Collaborate with family members/caregivers to mitigate the elopement risk   Communicate/escalate to charge nurse the risk of elopement   Make sure patient has all necessary personal care items   Escort with two staff members if patient must leave the unit   Place patient in room far away from exits and stairways   Reduce environmental triggers   Shoes and clothing collected and placed in gown attire

## 2025-01-04 NOTE — H&P
[place] [address]                           HISTORY & PHYSICAL      PATIENT NAME: FANI PERSAUD             : 2000  MED REC NO: [id number]                        ROOM: [id number]  ACCOUNT NO: [id number]                       ADMIT DATE: 2025  PROVIDER: Frankie Walton DO      HISTORY OF PRESENT ILLNESS:  The patient is an autistic 24-year-old white male with developmental delays, who presented to the hospital with a history and chief complaint of intractable nausea, vomiting, and diarrhea.  His grandmother has a similar problem, and it is suspected that she has [place] virus, likely the same scenario here.  He has been unable to keep anything down, multiple times of vomiting, multiple times of diarrhea.  We are going to go ahead and bring him in and hydrate him and check his stool for C diff, the usual pathogens, and cryptosporidium, start him on some fluids, keep him n.p.o. at this point.    PAST MEDICAL HISTORY:  Significant for asthma, chronic constipation, developmental delay, pica, bilious emesis, pyloric stenosis, seizure disorder.    PREVIOUS SURGICAL HISTORY:  Significant for pyloroplasty.    ALLERGIES:  TO PANCAKE MIX AND PEPPERMINT, BEE VENOM, HEPARIN, PENICILLINS, AND PLAVIX.      REVIEW OF SYSTEMS:  CONSTITUTIONAL:  Negative for vertigo, cephalgia, ringing in the ears, hearing loss, difficulty swallowing, hoarseness in his voice, bloody noses.    CARDIAC:  Currently, has no chest pain, palpitations, orthopnea, paroxysmal nocturnal dyspnea.    RESPIRATORY:  No cough, wheeze, hemoptysis.   GASTROINTESTINAL:  Positive for nausea.  Positive for vomiting.  Positive for diarrhea.  No blood in the stool.  Hemoccult on his emesis was checked and was positive, likely just from excessive vomiting.   GENITOURINARY:  Without urgency, frequency, dysuria, hematuria.   ENDOCRINE SYSTEM:  Negative for diabetes or thyroid

## 2025-01-04 NOTE — ED NOTES
ED to Inpatient Handoff Report    Notified Fay that electronic handoff available and patient ready for transport to room 537.    Safety Risks: Risk of falls    Patient in Restraints: no    Constant Observer or Patient : no    Telemetry Monitoring Ordered: Yes          Order to transfer to unit without monitor: YES    Last MEWS: 1 Time completed: 2005    Deterioration Index: 13.9    Vitals:    01/03/25 1326 01/03/25 1843 01/03/25 1941 01/03/25 2005   BP: 122/82 (!) 124/108  (!) 140/75   Pulse:  100  56   Resp:  17  16   Temp:  97.4 °F (36.3 °C)  97.8 °F (36.6 °C)   TempSrc:  Axillary     SpO2:  99%  98%   Weight:   59 kg (130 lb)    Height:   1.6 m (5' 3\")        Opportunity for questions and clarification was provided.

## 2025-01-05 VITALS
HEIGHT: 63 IN | DIASTOLIC BLOOD PRESSURE: 69 MMHG | HEART RATE: 70 BPM | SYSTOLIC BLOOD PRESSURE: 133 MMHG | WEIGHT: 114.1 LBS | BODY MASS INDEX: 20.21 KG/M2 | TEMPERATURE: 98.1 F | RESPIRATION RATE: 16 BRPM | OXYGEN SATURATION: 95 %

## 2025-01-05 PROCEDURE — 96361 HYDRATE IV INFUSION ADD-ON: CPT

## 2025-01-05 PROCEDURE — 96376 TX/PRO/DX INJ SAME DRUG ADON: CPT

## 2025-01-05 PROCEDURE — 6360000002 HC RX W HCPCS: Performed by: GENERAL PRACTICE

## 2025-01-05 PROCEDURE — 2580000003 HC RX 258: Performed by: GENERAL PRACTICE

## 2025-01-05 PROCEDURE — G0378 HOSPITAL OBSERVATION PER HR: HCPCS

## 2025-01-05 RX ORDER — TOBRAMYCIN AND DEXAMETHASONE 3; 1 MG/ML; MG/ML
2 SUSPENSION/ DROPS OPHTHALMIC
Qty: 5 ML | Refills: 0 | Status: SHIPPED | OUTPATIENT
Start: 2025-01-05 | End: 2025-01-15

## 2025-01-05 RX ORDER — TOBRAMYCIN AND DEXAMETHASONE 3; 1 MG/ML; MG/ML
2 SUSPENSION/ DROPS OPHTHALMIC
Status: DISCONTINUED | OUTPATIENT
Start: 2025-01-05 | End: 2025-01-05 | Stop reason: HOSPADM

## 2025-01-05 RX ADMIN — SODIUM CHLORIDE 40 MG: 9 INJECTION INTRAMUSCULAR; INTRAVENOUS; SUBCUTANEOUS at 07:26

## 2025-01-05 RX ADMIN — LEVETIRACETAM 500 MG: 100 INJECTION INTRAVENOUS at 10:10

## 2025-01-05 RX ADMIN — SODIUM CHLORIDE: 9 INJECTION, SOLUTION INTRAVENOUS at 11:54

## 2025-01-05 NOTE — PLAN OF CARE
Problem: Discharge Planning  Goal: Discharge to home or other facility with appropriate resources  Outcome: Progressing     Problem: Risk for Elopement  Goal: Patient will not exit the unit/facility without proper excort  Outcome: Progressing     Problem: Pain  Goal: Verbalizes/displays adequate comfort level or baseline comfort level  Outcome: Progressing  Flowsheets (Taken 1/4/2025 1906)  Verbalizes/displays adequate comfort level or baseline comfort level: Implement non-pharmacological measures as appropriate and evaluate response     Problem: Skin/Tissue Integrity  Goal: Absence of new skin breakdown  Description: 1.  Monitor for areas of redness and/or skin breakdown  2.  Assess vascular access sites hourly  3.  Every 4-6 hours minimum:  Change oxygen saturation probe site  4.  Every 4-6 hours:  If on nasal continuous positive airway pressure, respiratory therapy assess nares and determine need for appliance change or resting period.  Outcome: Progressing     Problem: Safety - Adult  Goal: Free from fall injury  Outcome: Progressing

## 2025-01-05 NOTE — PROGRESS NOTES
No vomiting or diarrhea since admission. Tolerating clears and meds. Left eye irritated. Eye drops give. Stable to go home with parents

## 2025-02-05 ENCOUNTER — PREP FOR PROCEDURE (OUTPATIENT)
Dept: DENTISTRY | Age: 25
End: 2025-02-05

## 2025-02-05 RX ORDER — SODIUM CHLORIDE 9 MG/ML
INJECTION, SOLUTION INTRAVENOUS PRN
Status: CANCELLED | OUTPATIENT
Start: 2025-02-05

## 2025-02-05 RX ORDER — SODIUM CHLORIDE 0.9 % (FLUSH) 0.9 %
5-40 SYRINGE (ML) INJECTION PRN
Status: CANCELLED | OUTPATIENT
Start: 2025-02-05

## 2025-02-05 RX ORDER — SODIUM CHLORIDE, SODIUM LACTATE, POTASSIUM CHLORIDE, CALCIUM CHLORIDE 600; 310; 30; 20 MG/100ML; MG/100ML; MG/100ML; MG/100ML
INJECTION, SOLUTION INTRAVENOUS CONTINUOUS
Status: CANCELLED | OUTPATIENT
Start: 2025-02-05

## 2025-02-05 RX ORDER — SODIUM CHLORIDE 0.9 % (FLUSH) 0.9 %
5-40 SYRINGE (ML) INJECTION EVERY 12 HOURS SCHEDULED
Status: CANCELLED | OUTPATIENT
Start: 2025-02-05

## 2025-03-03 RX ORDER — DIPHENHYDRAMINE HCL 25 MG
25 CAPSULE ORAL NIGHTLY PRN
COMMUNITY

## 2025-03-03 RX ORDER — DOCUSATE SODIUM 100 MG/1
100 CAPSULE, LIQUID FILLED ORAL DAILY
COMMUNITY

## 2025-03-03 NOTE — PROGRESS NOTES
[facility]   PRE-ADMISSION TESTING GENERAL INSTRUCTIONS  PAT Phone Number: [phone]      GENERAL INSTRUCTIONS:    [x] Antibacterial Soap Shower Night before AND the Morning of procedure.  [] The Night Before Surgery: Take an antibacterial soap shower - followed by CHG Wipes.   -The Morning of Surgery: Repeat CHG Wipes.  [x] Do not wear makeup, lotions, powders, deodorant the morning of surgery.  [x] No solid food after midnight.   [x] You may brush your teeth, gargle, but do not swallow water.   [] No tobacco products, illegal drugs, or alcohol within 24 hours of your surgery.  [x] Jewelry or valuables should not be brought to the hospital. All body and/or tongue piercing's must be removed prior to arriving to hospital. No contact lens or hair pins.   [x] Arrange transportation with a responsible adult  to and from the hospital. Arrange for someone to be with you for the remainder of the day and for 24 hours after your procedure due to having had anesthesia.          -Who will be your  for transportation? fahad        -Who will be staying with you for 24 hrs after your procedure? fahad  [x] Bring insurance card and photo ID.  [] Bring copy of living will or healthcare power of  papers to be placed in your electronic record.  [] Urine Pregnancy test will be preformed the day of surgery. A specimen sample may be brought from home.  [] Transfusion (Green) Bracelet: Please bring with you to hospital, day of surgery.     PARKING INSTRUCTIONS:     [x] ARRIVAL DATE & TIME: 3/7 @ 0615  [x] Times are subject to change. We will contact you the business day before surgery if that were to occur.  [x] Enter into the [facility] Entrance. Two people may accompany you. Masks are not required.  [x] Parking Lot \"I\" is where you will park. It is located on the corner of [address] and [address]. The entrance is on [address].   Only one vehicle - per patient, is  preparation. Your patience is greatly appreciated as you wait for your nurse.   [x] Delays may occur with surgery and staff will make a sincere effort to keep you informed of delays. If any delays occur with your procedure, we apologize ahead of time for your inconvenience as we recognize the value of your time.

## 2025-03-06 ENCOUNTER — ANESTHESIA EVENT (OUTPATIENT)
Dept: OPERATING ROOM | Age: 25
End: 2025-03-06
Payer: MEDICAID

## 2025-03-06 NOTE — H&P
Dental History and Physical    Billy Lee    [address] [place], [place] [id number]    The patient is a 24 y.o. male     Chief Complaint: no CC. Pt is non-verbal and caretaker states pt needs GA to have his dental needs addressed    History of present illness: due to patient's uncooperative behavior in the a dental clinic setting, recommended that pt go under GA sedation to receive comprehensive dental care.     Past Medical History:      Diagnosis Date    Autistic disorder     Coronavirus infection, unspecified 01/2025    Developmental non-verbal disorder     NON VERBAL    Excessive thirst     GERD (gastroesophageal reflux disease)     History of blood transfusion     2 mos.    History of stomach ulcers     Prolonged emergence from general anesthesia     Seizure disorder (HCC)        Past Surgical History:        Procedure Laterality Date    DENTAL SURGERY         Medications Prior to Admission:    Prior to Admission medications    Medication Sig Start Date End Date Taking? Authorizing Provider   docusate sodium (COLACE) 100 MG capsule Take 1 capsule by mouth daily   Yes Micaela Gill MD   diphenhydrAMINE (BENADRYL) 25 MG capsule Take 1 capsule by mouth nightly as needed for Sleep   Yes Micaela Gill MD   Midazolam (NAYZILAM) 5 MG/0.1ML SOLN 5 mg by Nasal route as needed (seizure) 9/26/24   Charlotte Quezada PA   levETIRAcetam (KEPPRA) 500 MG tablet take 1 tablet by mouth twice a day 5/20/24   Michelle Hodges APRN - CNP   risperiDONE (RISPERDAL) 1 MG tablet Take 1 tablet by mouth 2 times daily 1 mg in am and afternoon    Micaela Gill MD   cloNIDine (CATAPRES) 0.2 MG tablet Take 1 tablet by mouth nightly    ProviderMicaela MD       Allergies:  Food, Bactrim [sulfamethoxazole-trimethoprim], Bee venom, Clopidogrel bisulfate, Heparin, Penicillins, and Red dye    Social History:   TOBACCO:   reports that he has never smoked. He has never used smokeless tobacco.  ETOH:   reports no  history of alcohol use.  OCCUPATION:  unknown    Family History:   History reviewed. No pertinent family history.    REVIEW OF SYSTEMS:  Completed by Dr. Walton on 2/18/25    Labs and Imaging Studies   Basic Labs  CBC with Differential:    Lab Results   Component Value Date/Time    WBC 5.3 01/04/2025 08:35 AM    RBC 4.44 01/04/2025 08:35 AM    HGB 12.8 01/04/2025 08:35 AM    HCT 38.4 01/04/2025 08:35 AM     01/04/2025 08:35 AM    MCV 86.5 01/04/2025 08:35 AM    MCH 28.8 01/04/2025 08:35 AM    MCHC 33.3 01/04/2025 08:35 AM    RDW 12.1 01/04/2025 08:35 AM    LYMPHOPCT 19 01/04/2025 08:35 AM    MONOPCT 10 01/04/2025 08:35 AM    EOSPCT 0 01/04/2025 08:35 AM    BASOPCT 0 01/04/2025 08:35 AM    MONOSABS 0.53 01/04/2025 08:35 AM    LYMPHSABS 0.99 01/04/2025 08:35 AM    EOSABS 0.01 01/04/2025 08:35 AM    BASOSABS 0.01 01/04/2025 08:35 AM       Imaging Studies:  Radiology:   none    Oral Findings:    Hygiene: unable to assess due to patient's uncooperative behavior in the dental clinic setting    Dentition: unknown at this time    Teeth: caries: unknown at this time    Retained roots unknown at this time    Impactions tooth # unknown at this time     Gingiva: unknown at this time    Mucous Membrane: unknown at this time    Tongue: unknown at this time    Floor of mouth: unknown at this time    Alveolar Process: unknown at this time    Salivary Glands: unknown at this time    Tentative Diagnosis: dental caries    Resident Assessment and Plan: 1) radiographs 2) exam 3) indicated prophy or SRP 4) indicated restorations 5) indicated extractions      Juany Martinez DMD  3/6/2025  5:51 PM    Attending physician: Dr. Nereida Bejarano DDS    I agree with the above. Electronically signed by Nereida Bejarano DDS on 3/7/2025 at 7:05 AM

## 2025-03-07 ENCOUNTER — HOSPITAL ENCOUNTER (OUTPATIENT)
Age: 25
Setting detail: OUTPATIENT SURGERY
Discharge: HOME | End: 2025-03-07
Attending: DENTIST | Admitting: DENTIST
Payer: MEDICAID

## 2025-03-07 ENCOUNTER — ANESTHESIA (OUTPATIENT)
Dept: OPERATING ROOM | Age: 25
End: 2025-03-07
Payer: MEDICAID

## 2025-03-07 VITALS
BODY MASS INDEX: 18.96 KG/M2 | OXYGEN SATURATION: 94 % | HEIGHT: 66 IN | RESPIRATION RATE: 11 BRPM | HEART RATE: 84 BPM | SYSTOLIC BLOOD PRESSURE: 109 MMHG | TEMPERATURE: 98 F | WEIGHT: 118 LBS | DIASTOLIC BLOOD PRESSURE: 65 MMHG

## 2025-03-07 PROBLEM — K02.9 DENTAL CARIES: Status: ACTIVE | Noted: 2025-03-07

## 2025-03-07 PROCEDURE — 3700000000 HC ANESTHESIA ATTENDED CARE: Performed by: DENTIST

## 2025-03-07 PROCEDURE — 2500000003 HC RX 250 WO HCPCS: Performed by: NURSE ANESTHETIST, CERTIFIED REGISTERED

## 2025-03-07 PROCEDURE — 7100000000 HC PACU RECOVERY - FIRST 15 MIN: Performed by: DENTIST

## 2025-03-07 PROCEDURE — 6370000000 HC RX 637 (ALT 250 FOR IP): Performed by: DENTIST

## 2025-03-07 PROCEDURE — 7100000010 HC PHASE II RECOVERY - FIRST 15 MIN: Performed by: DENTIST

## 2025-03-07 PROCEDURE — 7100000011 HC PHASE II RECOVERY - ADDTL 15 MIN: Performed by: DENTIST

## 2025-03-07 PROCEDURE — 6360000002 HC RX W HCPCS: Performed by: NURSE ANESTHETIST, CERTIFIED REGISTERED

## 2025-03-07 PROCEDURE — 7100000001 HC PACU RECOVERY - ADDTL 15 MIN: Performed by: DENTIST

## 2025-03-07 PROCEDURE — 2709999900 HC NON-CHARGEABLE SUPPLY: Performed by: DENTIST

## 2025-03-07 PROCEDURE — 2580000003 HC RX 258: Performed by: DENTIST

## 2025-03-07 PROCEDURE — 6360000002 HC RX W HCPCS: Performed by: ANESTHESIOLOGY

## 2025-03-07 PROCEDURE — 3600000004 HC SURGERY LEVEL 4 BASE: Performed by: DENTIST

## 2025-03-07 PROCEDURE — 3600000014 HC SURGERY LEVEL 4 ADDTL 15MIN: Performed by: DENTIST

## 2025-03-07 PROCEDURE — 6360000002 HC RX W HCPCS: Performed by: DENTIST

## 2025-03-07 PROCEDURE — 3700000001 HC ADD 15 MINUTES (ANESTHESIA): Performed by: DENTIST

## 2025-03-07 RX ORDER — LIDOCAINE HYDROCHLORIDE AND EPINEPHRINE BITARTRATE 20; .01 MG/ML; MG/ML
INJECTION, SOLUTION SUBCUTANEOUS PRN
Status: DISCONTINUED | OUTPATIENT
Start: 2025-03-07 | End: 2025-03-07 | Stop reason: ALTCHOICE

## 2025-03-07 RX ORDER — SODIUM CHLORIDE 9 MG/ML
INJECTION, SOLUTION INTRAVENOUS PRN
Status: DISCONTINUED | OUTPATIENT
Start: 2025-03-07 | End: 2025-03-07 | Stop reason: HOSPADM

## 2025-03-07 RX ORDER — FENTANYL CITRATE 50 UG/ML
INJECTION, SOLUTION INTRAMUSCULAR; INTRAVENOUS
Status: DISCONTINUED | OUTPATIENT
Start: 2025-03-07 | End: 2025-03-07 | Stop reason: SDUPTHER

## 2025-03-07 RX ORDER — NALOXONE HYDROCHLORIDE 0.4 MG/ML
INJECTION, SOLUTION INTRAMUSCULAR; INTRAVENOUS; SUBCUTANEOUS PRN
Status: DISCONTINUED | OUTPATIENT
Start: 2025-03-07 | End: 2025-03-07 | Stop reason: HOSPADM

## 2025-03-07 RX ORDER — SODIUM CHLORIDE 0.9 % (FLUSH) 0.9 %
5-40 SYRINGE (ML) INJECTION EVERY 12 HOURS SCHEDULED
Status: DISCONTINUED | OUTPATIENT
Start: 2025-03-07 | End: 2025-03-07 | Stop reason: HOSPADM

## 2025-03-07 RX ORDER — DEXAMETHASONE SODIUM PHOSPHATE 10 MG/ML
INJECTION, SOLUTION INTRA-ARTICULAR; INTRALESIONAL; INTRAMUSCULAR; INTRAVENOUS; SOFT TISSUE
Status: DISCONTINUED | OUTPATIENT
Start: 2025-03-07 | End: 2025-03-07 | Stop reason: SDUPTHER

## 2025-03-07 RX ORDER — LIDOCAINE HYDROCHLORIDE 20 MG/ML
INJECTION, SOLUTION INTRAVENOUS
Status: DISCONTINUED | OUTPATIENT
Start: 2025-03-07 | End: 2025-03-07 | Stop reason: SDUPTHER

## 2025-03-07 RX ORDER — PROPOFOL 10 MG/ML
INJECTION, EMULSION INTRAVENOUS
Status: DISCONTINUED | OUTPATIENT
Start: 2025-03-07 | End: 2025-03-07 | Stop reason: SDUPTHER

## 2025-03-07 RX ORDER — SODIUM CHLORIDE 0.9 % (FLUSH) 0.9 %
5-40 SYRINGE (ML) INJECTION PRN
Status: DISCONTINUED | OUTPATIENT
Start: 2025-03-07 | End: 2025-03-07 | Stop reason: HOSPADM

## 2025-03-07 RX ORDER — ROCURONIUM BROMIDE 10 MG/ML
INJECTION, SOLUTION INTRAVENOUS
Status: DISCONTINUED | OUTPATIENT
Start: 2025-03-07 | End: 2025-03-07 | Stop reason: SDUPTHER

## 2025-03-07 RX ORDER — MIDAZOLAM HYDROCHLORIDE 1 MG/ML
INJECTION, SOLUTION INTRAMUSCULAR; INTRAVENOUS
Status: DISCONTINUED | OUTPATIENT
Start: 2025-03-07 | End: 2025-03-07 | Stop reason: SDUPTHER

## 2025-03-07 RX ORDER — ONDANSETRON 2 MG/ML
INJECTION INTRAMUSCULAR; INTRAVENOUS
Status: DISCONTINUED | OUTPATIENT
Start: 2025-03-07 | End: 2025-03-07 | Stop reason: SDUPTHER

## 2025-03-07 RX ORDER — FENTANYL CITRATE 50 UG/ML
25 INJECTION, SOLUTION INTRAMUSCULAR; INTRAVENOUS EVERY 5 MIN PRN
Status: DISCONTINUED | OUTPATIENT
Start: 2025-03-07 | End: 2025-03-07 | Stop reason: HOSPADM

## 2025-03-07 RX ORDER — DEXMEDETOMIDINE HYDROCHLORIDE 100 UG/ML
INJECTION, SOLUTION INTRAVENOUS
Status: DISCONTINUED | OUTPATIENT
Start: 2025-03-07 | End: 2025-03-07 | Stop reason: SDUPTHER

## 2025-03-07 RX ORDER — ONDANSETRON 2 MG/ML
4 INJECTION INTRAMUSCULAR; INTRAVENOUS
Status: COMPLETED | OUTPATIENT
Start: 2025-03-07 | End: 2025-03-07

## 2025-03-07 RX ORDER — FENTANYL CITRATE 50 UG/ML
50 INJECTION, SOLUTION INTRAMUSCULAR; INTRAVENOUS EVERY 5 MIN PRN
Status: DISCONTINUED | OUTPATIENT
Start: 2025-03-07 | End: 2025-03-07 | Stop reason: HOSPADM

## 2025-03-07 RX ORDER — SODIUM CHLORIDE, SODIUM LACTATE, POTASSIUM CHLORIDE, CALCIUM CHLORIDE 600; 310; 30; 20 MG/100ML; MG/100ML; MG/100ML; MG/100ML
INJECTION, SOLUTION INTRAVENOUS CONTINUOUS
Status: DISCONTINUED | OUTPATIENT
Start: 2025-03-07 | End: 2025-03-07 | Stop reason: HOSPADM

## 2025-03-07 RX ADMIN — LIDOCAINE HYDROCHLORIDE 60 MG: 20 INJECTION, SOLUTION INTRAVENOUS at 07:38

## 2025-03-07 RX ADMIN — ROCURONIUM BROMIDE 40 MG: 10 INJECTION, SOLUTION INTRAVENOUS at 07:38

## 2025-03-07 RX ADMIN — DEXMEDETOMIDINE HCL 4 MCG: 100 INJECTION INTRAVENOUS at 10:28

## 2025-03-07 RX ADMIN — DEXAMETHASONE SODIUM PHOSPHATE 10 MG: 10 INJECTION INTRAMUSCULAR; INTRAVENOUS at 07:38

## 2025-03-07 RX ADMIN — DEXMEDETOMIDINE HCL 4 MCG: 100 INJECTION INTRAVENOUS at 09:51

## 2025-03-07 RX ADMIN — FENTANYL CITRATE 50 MCG: 50 INJECTION, SOLUTION INTRAMUSCULAR; INTRAVENOUS at 08:55

## 2025-03-07 RX ADMIN — DEXMEDETOMIDINE HCL 8 MCG: 100 INJECTION INTRAVENOUS at 08:55

## 2025-03-07 RX ADMIN — ONDANSETRON HYDROCHLORIDE 4 MG: 2 SOLUTION INTRAMUSCULAR; INTRAVENOUS at 10:03

## 2025-03-07 RX ADMIN — MIDAZOLAM 2 MG: 1 INJECTION INTRAMUSCULAR; INTRAVENOUS at 07:25

## 2025-03-07 RX ADMIN — SODIUM CHLORIDE, POTASSIUM CHLORIDE, SODIUM LACTATE AND CALCIUM CHLORIDE: 600; 310; 30; 20 INJECTION, SOLUTION INTRAVENOUS at 07:25

## 2025-03-07 RX ADMIN — FENTANYL CITRATE 50 MCG: 50 INJECTION, SOLUTION INTRAMUSCULAR; INTRAVENOUS at 07:38

## 2025-03-07 RX ADMIN — PROPOFOL 180 MG: 10 INJECTION, EMULSION INTRAVENOUS at 07:38

## 2025-03-07 RX ADMIN — SUGAMMADEX 107 MG: 100 INJECTION, SOLUTION INTRAVENOUS at 10:22

## 2025-03-07 RX ADMIN — ONDANSETRON 4 MG: 2 INJECTION, SOLUTION INTRAMUSCULAR; INTRAVENOUS at 10:57

## 2025-03-07 ASSESSMENT — PAIN SCALES - GENERAL
PAINLEVEL_OUTOF10: 0
PAINLEVEL_OUTOF10: 0

## 2025-03-07 ASSESSMENT — PAIN - FUNCTIONAL ASSESSMENT: PAIN_FUNCTIONAL_ASSESSMENT: ADULT NONVERBAL PAIN SCALE (NPVS)

## 2025-03-07 NOTE — DISCHARGE INSTRUCTIONS
Post- Operative Patient  Instructions for [facility]     Please read and follow these instructions carefully. The after effects of oral surgery vary per child, so not all of these instructions may apply. Please feel free to call our clinic any time should you have any questions, or are experiencing any unusual symptoms following your treatment. There is always a dental resident on call after hours that you may reach to discuss your child's care.                                                            Day of Surgery    Immediately after surgery.Patients that have received a general anesthetic should return home from the hospital immediately upon discharge, and lie down with head elevated until all effects of the anesthesia have disappeared. Anesthetic effects vary by individual, and you may feel drowsy for a short period of time or for several hours. Your child should not participate in activities for at least 12 hours or longer if they appear drowsing or tired from the residual effects of the anesthesia.    Do not send your child to school within 24 hours after procedure.    Do not allow your child to participate in activities before 12 hours or longer depending on how your child is feeling.     Watch out for dizziness. Have them walk slowly and take their time. Sudden changes of position can also cause nausea.    Diet: If they feel nauseated or sick to your stomach, drink clear liquids like 7-up, room temperature broth, apple juice, ginger ale, room temperature tea, cola, or eat jello. If these liquids do not make you sick to their stomach, try eating soft foods like mashed potatoes, scrambled eggs, and cereal.    6)  Discuss any questions you may have with the dental clinic at [phone] during    office hours or [phone] on weekends and evening.                                                                                                      Oral Hygiene and Care    Do not disturb  cavities may develop sooner after if the above directions are not followed and the diet and hygiene are not changed                                                                Follow Up   It is our desire that your recovery be as smooth as possible and as pleasant as possible. If you have any questions about your progress or any symptoms, please call the dental clinic during office hours at [phone] or at the after hours number [phone].      1- Week Follow-up Appointment at the Dental Clinic:   3/14/25  at 3:00pm        Juany Martinez DMD

## 2025-03-07 NOTE — BRIEF OP NOTE
Brief Postoperative Note      Patient: Billy Lee  YOB: 2000  MRN: [id number]    Date of Procedure: 3/7/2025    Pre-Op Diagnosis Codes:      * Dental caries [K02.9]    Post-Op Diagnosis:  dental caries, generalized gingivitis, dental abscess       Procedure(s):  DENTAL RESTORATIONS  Dental extractions x2    Surgeon(s):  Nereida Bejarano DDS Juszczak, [place], DMD    Assistant:  Resident: Juany Martinez DMD    Anesthesia: General ETT    Estimated Blood Loss (mL): less than 10mL    Complications: None    Specimens:   * No specimens in log *    Implants:  * No implants in log *      Drains: * No LDAs found *    Findings:  Infection Present At Time Of Surgery (PATOS) (choose all levels that have infection present):  No infection present  Other Findings: dental caries, dental abscess, generalized gingivitis    Electronically signed by Juany Martinez DMD on 3/7/2025 at 10:28 AM    I agree with the above. Electronically signed by Nereida Bejarano DDS on 3/7/2025 at 2:57 PM

## 2025-03-07 NOTE — INTERVAL H&P NOTE
Update History & Physical    The patient's History and Physical of February 18, 2025 was reviewed with the patient and I examined the patient. There was no change. The surgical site was confirmed by the patient's guardian and me.     Plan: The risks, benefits, expected outcome, and alternative to the recommended procedure have been discussed with the patient's guardian. Patient's guardian understands and wants to proceed with the procedure.     Electronically signed by Juany Martinez DMD on 3/7/2025 at 7:28 AM    I agree with the above. Electronically signed by Nereida Bejarano DDS on 3/7/2025 at 2:59 PM

## 2025-03-07 NOTE — PROGRESS NOTES
Called in Z-pack and Motrin 800mg into patient's pharmacy ([facility] on [facility] [address] [facility]) to ensure that the tablets do not contain red dye.     Z-pack   Disp: 1 pack   Sig: take according to manufacture instructions    Motrin 800mg  Disp 30 tabs  Sig take 1 tab po q8h prn pain     Juany Martinez DMD (dental resident)

## 2025-03-07 NOTE — ANESTHESIA POSTPROCEDURE EVALUATION
Department of Anesthesiology  Postprocedure Note    Patient: Billy Lee  MRN: [id number]  YOB: 2000  Date of evaluation: 3/7/2025    Procedure Summary       Date: 03/07/25 Room / Location: [address]    Anesthesia Start: 0725 Anesthesia Stop:     Procedure: DENTAL RESTORATIONS (Mouth) Diagnosis:       Dental caries      (Dental caries [K02.9])    Surgeons: Nereida Bejarano DDS Responsible Provider: Codey Izquierdo MD    Anesthesia Type: general ASA Status: 2            Anesthesia Type: No value filed.    Mindi Phase I: Mindi Score: 10    Mindi Phase II:      Anesthesia Post Evaluation    Patient location during evaluation: PACU  Patient participation: complete - patient participated  Level of consciousness: awake and alert  Pain score: 2  Airway patency: patent  Nausea & Vomiting: no nausea and no vomiting  Cardiovascular status: blood pressure returned to baseline  Respiratory status: acceptable  Hydration status: euvolemic  Pain management: adequate    No notable events documented.

## 2025-03-07 NOTE — ANESTHESIA PRE PROCEDURE
08:35 AM    K 3.6 01/04/2025 08:35 AM     01/04/2025 08:35 AM    CO2 22 01/04/2025 08:35 AM    BUN 11 01/04/2025 08:35 AM    CREATININE 0.8 01/04/2025 08:35 AM    LABGLOM >90 01/04/2025 08:35 AM    GLUCOSE 89 01/04/2025 08:35 AM    CALCIUM 8.3 01/04/2025 08:35 AM    BILITOT 0.4 01/04/2025 08:35 AM    ALKPHOS 36 01/04/2025 08:35 AM    AST 19 01/04/2025 08:35 AM    ALT 11 01/04/2025 08:35 AM       POC Tests: No results for input(s): \"POCGLU\", \"POCNA\", \"POCK\", \"POCCL\", \"POCBUN\", \"POCHEMO\", \"POCHCT\" in the last 72 hours.    Coags: No results found for: \"PROTIME\", \"INR\", \"APTT\"    HCG (If Applicable): No results found for: \"PREGTESTUR\", \"PREGSERUM\", \"HCG\", \"HCGQUANT\"     ABGs: No results found for: \"PHART\", \"PO2ART\", \"NPE7SMP\", \"YDE0DJY\", \"BEART\", \"P4MMLWIO\"     Type & Screen (If Applicable):  Lab Results   Component Value Date    ABORH O POSITIVE 01/03/2025    LABANTI NEGATIVE 01/03/2025       Drug/Infectious Status (If Applicable):  No results found for: \"HIV\", \"HEPCAB\"    COVID-19 Screening (If Applicable): No results found for: \"COVID19\"        Anesthesia Evaluation    Airway: Mallampati: I  TM distance: >3 FB   Neck ROM: full  Mouth opening: > = 3 FB   Dental: normal exam         Pulmonary: breath sounds clear to auscultation                             Cardiovascular:            Rhythm: regular  Rate: normal                    Neuro/Psych:   (+) psychiatric history: stable with treatment             ROS comment: Autism GI/Hepatic/Renal:   (+) GERD:          Endo/Other:                     Abdominal:             Vascular:          Other Findings:       Anesthesia Plan      general     ASA 2       Induction: intravenous.  continuous noninvasive hemodynamic monitor  MIPS: Postoperative opioids intended and Prophylactic antiemetics administered.  Anesthetic plan and risks discussed with patient and legal guardian.      Plan discussed with CRNA.                Codey Izquierdo MD   3/6/2025

## 2025-03-07 NOTE — PROGRESS NOTES
Family to bedside  Dr. Bejarano bedside spoke with patient family reviewed discharge instructions with them.

## 2025-03-07 NOTE — OP NOTE
Date of Procedure: 3/7/2025     Surgeon: JACKIE Luquezczak, DMD    Surgical Wound Classification: Clean Contaminated II    Preoperative Diagnosis: dental caries      Postoperative Diagnosis: dental caries, dental abscess, generalized gingivitis    Operation: Exam, Prophy, Fluoride Treatment, Restorative:x9 Extractions: x2    Anesthesia: General ETT    Estimated Blood Loss: less than 10 mL    Complications:  none    Fluids: 1095 mL    Specimen: none    Conditions:  good    Disposition: To PACU, stable    Procedure: This patient was initially seen in the preoperative holding area, where the history, physical and consents were updated and verified.  The patient was then transferred via the anesthesia team and [facility] to operating room # 12 at [facility] on 3/7/2025 , at which time the patient was transferred from the [facility] onto the operating room table and placed in the supine position.  The patient's arms and legs were padded at the sides.  The patient had the general anesthetic monitors applied.  Please see anesthesia notes for complete details.    Once the patient was placed into a general anesthetic state, the surgical area was prepped and draped in a standard oral and maxillofacial surgery fashion.  A throat pack was placed.  A comprehensive oral exam was performed.  19 radiographs were taken.  A treatment plan was formulated based upon presenting clinical and radiographic findings.  Caries were identified, followed by the preparation of the following teeth: #2,4,12,13,14,17,18,31,32.  Dental restorations were placed as follows: #2-O,4-O,12-O,13-O,14-MOL,17-O,18-OB,31-OB,32-O.  Dental restorations were polished and refined to proper occlusion.  A prophylaxis with cavitron was completed. Polished with pumice. Fluoride and peridex applied. Generalized wear present due to bruxism.       Surgical procedure was initiated.  Using 1.5 length 27-gauge needle, injected 4.5 mL of 2%

## (undated) DEVICE — DENTAL: Brand: MEDLINE INDUSTRIES, INC.

## (undated) DEVICE — ELECTRODE PT RET AD L9FT HI MOIST COND ADH HYDRGEL CORDED

## (undated) DEVICE — GLOVE ORANGE PI 8   MSG9080

## (undated) DEVICE — GLOVE SURG SZ 65 THK91MIL LTX FREE SYN POLYISOPRENE

## (undated) DEVICE — GOWN,SIRUS,FABRNF,L,20/CS: Brand: MEDLINE